# Patient Record
Sex: FEMALE
[De-identification: names, ages, dates, MRNs, and addresses within clinical notes are randomized per-mention and may not be internally consistent; named-entity substitution may affect disease eponyms.]

---

## 2018-07-13 NOTE — PRE-PROCEDURE NOTE/ATTESTATION
Pre-Procedure Note/Attestation


Complete Prior to Procedure


Planned Procedure:  left


Procedure Narrative:


1. CATARACT EXTRACTION WITH PHACO AND  PC IOL IMPLANTATION, LEFT EYE.


2. LIMBAL RELAXING INCISION, LEFT EYE





Indications for Procedure


Pre-Operative Diagnosis:


1. CATARACT , LEFT EYE.


2. ASTIGMATISM, LEFT EYE.





Attestation


I attest that I discussed the nature of the procedure; its benefits; risks and 

complications; and alternatives (and the risks and benefits of such alternatives

), prior to the procedure, with the patient (or the patient's legal 

representative).





I attest that, if there was a reasonable possibility of needing a blood 

transfusion, the patient (or the patient's legal representative) was given the 

[place] Department of Health Services standardized written summary, pursuant 

to the [place] Blood Safety Act ([place] Health and Safety Code # 1645, as 

amended).





I attest that I re-evaluated the patient just prior to the surgery and that 

there has been no change in the patient's H&P, except as documented below:











Aldo Babin MD Jul 13, 2018 15:18

## 2018-07-17 ENCOUNTER — HOSPITAL ENCOUNTER (OUTPATIENT)
Dept: HOSPITAL 72 - SUR | Age: 68
Discharge: HOME | End: 2018-07-17
Payer: MEDICARE

## 2018-07-17 VITALS — SYSTOLIC BLOOD PRESSURE: 113 MMHG | DIASTOLIC BLOOD PRESSURE: 67 MMHG

## 2018-07-17 VITALS — SYSTOLIC BLOOD PRESSURE: 114 MMHG | DIASTOLIC BLOOD PRESSURE: 63 MMHG

## 2018-07-17 VITALS — SYSTOLIC BLOOD PRESSURE: 105 MMHG | DIASTOLIC BLOOD PRESSURE: 68 MMHG

## 2018-07-17 VITALS — DIASTOLIC BLOOD PRESSURE: 66 MMHG | SYSTOLIC BLOOD PRESSURE: 115 MMHG

## 2018-07-17 VITALS — SYSTOLIC BLOOD PRESSURE: 102 MMHG | DIASTOLIC BLOOD PRESSURE: 64 MMHG

## 2018-07-17 VITALS — DIASTOLIC BLOOD PRESSURE: 65 MMHG | SYSTOLIC BLOOD PRESSURE: 107 MMHG

## 2018-07-17 VITALS — DIASTOLIC BLOOD PRESSURE: 77 MMHG | SYSTOLIC BLOOD PRESSURE: 117 MMHG

## 2018-07-17 VITALS — HEIGHT: 60 IN | BODY MASS INDEX: 23.16 KG/M2 | WEIGHT: 118 LBS

## 2018-07-17 VITALS — DIASTOLIC BLOOD PRESSURE: 70 MMHG | SYSTOLIC BLOOD PRESSURE: 116 MMHG

## 2018-07-17 VITALS — DIASTOLIC BLOOD PRESSURE: 66 MMHG | SYSTOLIC BLOOD PRESSURE: 111 MMHG

## 2018-07-17 DIAGNOSIS — Z87.891: ICD-10-CM

## 2018-07-17 DIAGNOSIS — K21.9: ICD-10-CM

## 2018-07-17 DIAGNOSIS — H25.812: Primary | ICD-10-CM

## 2018-07-17 DIAGNOSIS — H35.3130: ICD-10-CM

## 2018-07-17 DIAGNOSIS — E78.00: ICD-10-CM

## 2018-07-17 DIAGNOSIS — E78.5: ICD-10-CM

## 2018-07-17 DIAGNOSIS — H52.202: ICD-10-CM

## 2018-07-17 DIAGNOSIS — E89.0: ICD-10-CM

## 2018-07-17 LAB
ADD MANUAL DIFF: NO
ANION GAP SERPL CALC-SCNC: 7 MMOL/L (ref 5–15)
BASOPHILS NFR BLD AUTO: 0.7 % (ref 0–2)
BUN SERPL-MCNC: 8 MG/DL (ref 7–18)
CALCIUM SERPL-MCNC: 8.5 MG/DL (ref 8.5–10.1)
CHLORIDE SERPL-SCNC: 108 MMOL/L (ref 98–107)
CO2 SERPL-SCNC: 25 MMOL/L (ref 21–32)
CREAT SERPL-MCNC: 0.7 MG/DL (ref 0.55–1.3)
EOSINOPHIL NFR BLD AUTO: 10.8 % (ref 0–3)
ERYTHROCYTE [DISTWIDTH] IN BLOOD BY AUTOMATED COUNT: 12 % (ref 11.6–14.8)
HCT VFR BLD CALC: 37.8 % (ref 37–47)
HGB BLD-MCNC: 12.4 G/DL (ref 12–16)
LYMPHOCYTES NFR BLD AUTO: 29.2 % (ref 20–45)
MCV RBC AUTO: 88 FL (ref 80–99)
MONOCYTES NFR BLD AUTO: 7.7 % (ref 1–10)
NEUTROPHILS NFR BLD AUTO: 51.7 % (ref 45–75)
PLATELET # BLD: 193 K/UL (ref 150–450)
POTASSIUM SERPL-SCNC: 4 MMOL/L (ref 3.5–5.1)
RBC # BLD AUTO: 4.3 M/UL (ref 4.2–5.4)
SODIUM SERPL-SCNC: 140 MMOL/L (ref 136–145)
WBC # BLD AUTO: 5.4 K/UL (ref 4.8–10.8)

## 2018-07-17 PROCEDURE — 94150 VITAL CAPACITY TEST: CPT

## 2018-07-17 PROCEDURE — 93005 ELECTROCARDIOGRAM TRACING: CPT

## 2018-07-17 PROCEDURE — 85025 COMPLETE CBC W/AUTO DIFF WBC: CPT

## 2018-07-17 PROCEDURE — 36415 COLL VENOUS BLD VENIPUNCTURE: CPT

## 2018-07-17 PROCEDURE — 80048 BASIC METABOLIC PNL TOTAL CA: CPT

## 2018-07-17 PROCEDURE — 66984 XCAPSL CTRC RMVL W/O ECP: CPT

## 2018-07-17 PROCEDURE — 94003 VENT MGMT INPAT SUBQ DAY: CPT

## 2018-07-17 PROCEDURE — 66999 UNLISTED PX ANT SEGMENT EYE: CPT

## 2018-07-17 RX ADMIN — LIDOCAINE HYDROCHLORIDE SCH DROP: 35 GEL OPHTHALMIC at 08:42

## 2018-07-17 RX ADMIN — PHENYLEPHRINE HYDROCHLORIDE SCH DROP: 100 SOLUTION/ DROPS OPHTHALMIC at 08:47

## 2018-07-17 RX ADMIN — Medication SCH DROP: at 08:41

## 2018-07-17 RX ADMIN — PHENYLEPHRINE HYDROCHLORIDE SCH DROP: 100 SOLUTION/ DROPS OPHTHALMIC at 08:34

## 2018-07-17 RX ADMIN — MOXIFLOXACIN HYDROCHLORIDE SCH DROP: 5 SOLUTION/ DROPS OPHTHALMIC at 08:34

## 2018-07-17 RX ADMIN — MOXIFLOXACIN HYDROCHLORIDE SCH DROP: 5 SOLUTION/ DROPS OPHTHALMIC at 08:41

## 2018-07-17 RX ADMIN — DICLOFENAC SODIUM SCH DROP: 1 SOLUTION/ DROPS OPHTHALMIC at 08:34

## 2018-07-17 RX ADMIN — PHENYLEPHRINE HYDROCHLORIDE SCH DROP: 100 SOLUTION/ DROPS OPHTHALMIC at 08:42

## 2018-07-17 RX ADMIN — DICLOFENAC SODIUM SCH DROP: 1 SOLUTION/ DROPS OPHTHALMIC at 08:47

## 2018-07-17 RX ADMIN — MOXIFLOXACIN HYDROCHLORIDE SCH DROP: 5 SOLUTION/ DROPS OPHTHALMIC at 08:46

## 2018-07-17 RX ADMIN — Medication SCH DROP: at 08:34

## 2018-07-17 RX ADMIN — LIDOCAINE HYDROCHLORIDE SCH DROP: 35 GEL OPHTHALMIC at 08:35

## 2018-07-17 RX ADMIN — DICLOFENAC SODIUM SCH DROP: 1 SOLUTION/ DROPS OPHTHALMIC at 08:41

## 2018-07-17 RX ADMIN — Medication SCH DROP: at 08:46

## 2018-07-17 RX ADMIN — LIDOCAINE HYDROCHLORIDE SCH DROP: 35 GEL OPHTHALMIC at 08:47

## 2018-07-17 NOTE — DISCHARGE SUMMARY
Discharge Summary


DATE OF ADMISSION:  07/17/2018





DATE OF DISCHARGE: 07/17/2018





REASON FOR HOSPITALIZATION: 1- Cataract, left eye 2- Astigmatism, left eye





SURGERY PERFORMED: 1- cataract extraction, left eye 2- LRI, left eye 








CONDITION IN THE HOSPITAL:The patient tolerated the surgery without 

complications.





DISCHARGE CONDITION: The patient was stable at discharge.





DISCHARGE MEDICATIONS:


1. Vigamox eye drops one drop q.i.d, OS


2. Prednisolone one drop q.i.d, OS   


3. Prolensa 1 drop qd, OS








POSTOPERATIVE ORDERS: The patient has to rest at home. No bending, No lifting, 

No watching Television tonight.





POSTOPERATIVE FOLLOW UP: The patient will be followed in my office tomorrow 

morning at 7 o'clock.











Aldo Babin MD Jul 17, 2018 10:26

## 2018-07-17 NOTE — PRE-OP HX & PHY REPO 2 SIG
DATE OF ADMISSION:  2018



PRESURGICAL INTERNAL MEDICINE HISTORY AND PHYSICAL



DATE OF EVALUATION:  2018.



REASON FOR EVALUATION:  I was asked by Dr. Aldo Babin to see this

67-year-old female, who is going for elective surgery on the left eye.

The patient has a cataract, left eye.  Please see full Ophthalmology

History and Physical by Dr. Aldo Babin.  The patient was evaluated.

Chart was reviewed.



PAST MEDICAL HISTORY AND REVIEW OF SYSTEMS:  Remarkable for hypothyroidism

and GERD.  No history of stroke.  No hypertension.  Denies history of

diabetes.  No MI or chest pain.  Denies respiratory problem, asthma or

bronchitis.  Denies renal failure.



PAST SURGICAL HISTORY:  Thyroidectomy two years ago for tumor.



FAMILY HISTORY:  Mother  with history of dialysis and renal failure.

Father has stroke.



ALLERGIES:  Not known.



PRESENT MEDICATIONS:  Include levothyroxine 0.1 mg, vitamin E, A, fish oil,

and omeprazole p.r.n.



SOCIAL HISTORY:  The patient smoked in the past, more than 15 years ago.

Denies alcohol or street drug use.



PHYSICAL EXAMINATION:

GENERAL:  Alert, well-developed, well-nourished female, in her 60s.

 

VITAL SIGNS:  Weight 111 pounds, blood pressure 117/77, temperature 98,

pulse 61, respirations 18, and O2 saturation 99% on room air.

SKIN:  Dry and warm.  No rashes.  No ulcers.

HEENT:  Head, normocephalic, atraumatic.  Ears, clear.  Eyes, full

description per Dr. Aldo Babin.  Mouth, clear and moist.

NECK:  Supple.  Anterior wall scar.  No palpable mass.

LYMPHATICS:  Lymph nodes not enlarged.

CHEST:  No deformity or asymmetry.

LUNGS:  Clear to auscultation and percussion.  No rales or rhonchi.

HEART:  Sinus rhythm.  No ectopy.  No murmur.  No S3 or S4.

ABDOMEN:  Soft, benign.  Liver and spleen not enlarged.  No

rebound.

EXTREMITIES:  No edema.  No varicose vein.  No calf tenderness.

GENITOURINARY TRACT:  Denies dysuria.  No CVA tenderness.

NERVOUS SYSTEM:  No tremor.  No nystagmus.



DIAGNOSTIC AND LABORATORY DATA:  ECG, normal sinus rhythm, normal ECG.  The

patient did not eat or drink from last night.  Lab work pending.



IMPRESSION:

1. Cataract, left eye.

2. Hypothyroidism.

3. GERD.



PLAN:  Cataract extraction, left eye with intraocular lens implant per Dr. Aldo Babin.



CONCLUSION:  The patient's vital signs stable.  The patient did not eat or

drink from last night.  The patient's ECG showed normal sinus rhythm,

normal ECG.  Laboratory work pending.  The patient's condition optimized

for surgery.



Thank you very much, Dr. Babin, for privilege to participate in

presurgical care of this interesting patient.









  ______________________________________________

  Dimitrios Garcia M.D.





DR:  RAJIV

D:  2018 09:57

T:  2018 00:04

JOB#:  [id number]

CC:

## 2018-07-17 NOTE — BRIEF OPERATIVE NOTE
Immediate Post Operative Note


Operative Note


Chief Complaint:   Blurry vision, difficulty driving and reading, left eye


Pre-op Diagnosis:


1. CATARACT , LEFT EYE.


2. ASTIGMATISM, LEFT EYE.


Procedure:


1- Cataract extraction with phaco and PC IOL implantation, left eye


2- Limbal relaxing incision LRI ), left eye


Post-op Diagnosis:  same as pre-op


Surgeon:  Aldo moore MD


Assistant:  None


Additional Surgeons:  None


Anesthesiologist:  Petty Lincoln


Anesthesia:  MAC


Specimen:  none


Complications:  none


Condition:  stable


Fluids:  500ml


Estimated Blood Loss:  none


Drains:  none


Implant(s) used?:  Yes - Monofocal PC IOl implanted in the left eye without 

complication











Aldo Moore MD Jul 17, 2018 10:29

## 2018-07-17 NOTE — ANETHESIA PREOPERATIVE EVAL
Anesthesia Pre-op PMH/ROS


General


Date of Evaluation:  Jul 17, 2018


Time of Evaluation:  09:27


Anesthesiologist:  


ASA Score:  ASA 2


Mallampati Score


Class I : Soft palate, uvula, fauces, pillars visible


Class II: Soft palate, uvula, fauces visible


Class III: Soft palate, base of uvula visible


Class IV: Only hard plate visible


Mallampati Classification:  Class II


Surgeon:  oscar


Diagnosis:  left eye cataract


Surgical Procedure:  left eye cataract extraction w/iol implant


Anesthesia History:  none


Family History:  no anesthesia problems


Allergies:  


Coded Allergies:  


     No Known Allergies (Unverified , 7/13/18)





Past Medical History


Cardiovascular:  Denies: HTN, CAD, MI, valve dz, arrhythmia, other


Pulmonary:  Denies: asthma, COPD, AISSATOU, other


Gastrointestinal/Genitourinary:  Reports: GERD; 


   Denies: CRI, ESRD, other


Neurologic/Psychiatric:  Denies: dementia, CVA, depression/anxiety, TIA, other


Endocrine:  Reports: hypothyroidism; 


   Denies: DM, steroids, other


HEENT:  Reports: cataract (L); 


   Denies: cataract (R), glaucoma, [place] (L), [place] (R), other


Hematology/Immune:  Denies: anemia, DVT, bleeding disorder, other


Musculoskeletal/Integumentary:  Denies: OA, RA, DJD, DDD, edema, other


PSxH Narrative:


thyroid





Anesthesia Pre-op Phys. Exam


Physician Exam





Last Vital Signs








  Date Time  Temp Pulse Resp B/P (MAP) Pulse Ox O2 Delivery O2 Flow Rate FiO2


 


7/17/18 08:45      Room Air  


 


7/17/18 08:41 98.0 61 18 117/77 (90) 99   





 98.0       








Constitutional:  NAD


Cardiovascular:  RRR


Respiratory:  CTA


Gastrointestinal:  S/NT/ND





Airway Exam


Mallampati Score:  Class II


MO:  full


ROM:  full


Teeth:  intact


Dentures:  no upper, no lower





Anesthesia Pre-op A/P


Labs





Hematology








Test


  7/17/18


08:15


 


White Blood Count


  5.4 K/UL


(4.8-10.8)


 


Red Blood Count


  4.30 M/UL


(4.20-5.40)


 


Hemoglobin


  12.4 G/DL


(12.0-16.0)


 


Hematocrit


  37.8 %


(37.0-47.0)


 


Mean Corpuscular Volume 88 FL (80-99)  


 


Mean Corpuscular Hemoglobin


  28.8 PG


(27.0-31.0)


 


Mean Corpuscular Hemoglobin


Concent 32.8 G/DL


(32.0-36.0)


 


Red Cell Distribution Width


  12.0 %


(11.6-14.8)


 


Platelet Count


  193 K/UL


(150-450)


 


Mean Platelet Volume


  6.9 FL


(6.5-10.1)


 


Neutrophils (%) (Auto)


  51.7 %


(45.0-75.0)


 


Lymphocytes (%) (Auto)


  29.2 %


(20.0-45.0)


 


Monocytes (%) (Auto)


  7.7 %


(1.0-10.0)


 


Eosinophils (%) (Auto)


  10.8 %


(0.0-3.0)  H


 


Basophils (%) (Auto)


  0.7 %


(0.0-2.0)








Chemistry








Test


  7/17/18


08:15


 


Sodium Level


  140 MMOL/L


(136-145)


 


Potassium Level


  4.0 MMOL/L


(3.5-5.1)


 


Chloride Level


  108 MMOL/L


()  H


 


Carbon Dioxide Level


  25 MMOL/L


(21-32)


 


Anion Gap


  7 mmol/L


(5-15)


 


Blood Urea Nitrogen


  8 mg/dL (7-18)


 


 


Creatinine


  0.7 MG/DL


(0.55-1.30)


 


Estimat Glomerular Filtration


Rate > 60 mL/min


(>60)


 


Glucose Level


  102 MG/DL


()


 


Calcium Level


  8.5 MG/DL


(8.5-10.1)











Risk Assessment & Plan


Assessment:


asa 2


Plan:


MAC


Status Change Before Surgery:  No





Pre-Antibiotics


Drug:  none











Ana Kim M.D. Jul 17, 2018 09:17

## 2018-07-17 NOTE — OPERATIVE NOTE - DICTATED
DATE OF OPERATION:  07/17/2018



FACILITY:  [facility].



SURGEON:  Aldo Babin M.D.



ASSISTANT:  None.



ANESTHESIOLOGIST:  Dr. Kim.



ANESTHESIA:  Monitored anesthesia care (MAC).



PREOPERATIVE DIAGNOSES:

1. Cataract, left eye.

2. Astigmatism, left eye.



POSTOPERATIVE DIAGNOSES:

1. Cataract, left eye.

2. Astigmatism, left eye.



SURGERY PERFORMED:

1. Cataract extraction with phacoemulsification and posterior chamber

intraocular lens implantation, left eye.

2. Limbal-relaxing incision (LRI), left eye.



INDICATION FOR SURGERY:  The patient is a 67-year-old lady with history of

age-related macular degeneration in both eyes, hypercholesterolemia, and

hyperlipidemia.  She is taking medications including levothyroxine for

hypothyroidism, simvastatin for hyperlipidemia, and Diovan for

hypertension.  She is not allergic to any medications.  She is not

drinker.  She is not a smoker.  She is complaining of blurred vision in

the left eye.  On examination of the left eye, the cornea is clear.  The

anterior chamber is clean and quiet.  Pupillary reflexes normal.  There is

no RAPD.  There is 4+ nuclear sclerosis and 2+ cortical cataract in the

left eye.  On examination of the fundus, there is _____ age-related

macular degeneration, but optic disc and peripheral retina are within

normal limits.  To improve her vision in the left eye, the cataract has to

be removed and posterior chamber intraocular lens has to be implanted.



INFORMED CONSENT:  The nature of the surgery, risks, benefits,

alternatives, and potential complications were explained all in detail to

the patient.  The potential complications including but not limited to

bleeding, infection, posterior capsular rupture, lens subluxation, flat

anterior chamber, iris prolapse, uveitis, corneal edema, macular edema,

endophthalmitis, retinal detachment, loss of vision, and even loss of the

eye were all explained in detail to the patient.  The patient voiced

understanding and accepted all the complications.  The alternatives

including accommodating lens, multifocal lens, toric lens, and

conventional cataract surgery with limbal-relaxing incision (LRI) for

treatment of astigmatism were all explained in detail to the patient who

voiced understanding.  The patient elected to have conventional cataract

surgery with limbal-relaxing incision for treatment of astigmatism.  Then,

she signed the consent form which is in the chart.



DESCRIPTION OF SURGERY AND FINDINGS:  Following that, the patient was taken

to the operating room in stable condition.  Lidocaine gel, Akten 3.5% were

applied to the conjunctiva of the left eye.  IV sedation was given by the

anesthesiologist, Dr. Kim.  After adequate anesthesia and sedation had

been achieved, the left eye was prepped and draped in sterile fashion for

intraocular surgery.  Following that, a speculum was placed in the left

eye.  Before the patient was taken to the operation room, the cornea was

marked at 180 and 90 meridian.  In the operation room, using a corneal

marking pen, the steep meridian of the cornea was marked.  Following that,

using a natasha knife with 600 micron blade, two parallel incisions were

placed on the steep meridian of the cornea to treat the astigmatism.

Following that, using a Super Sharp knife, a clear corneal side port was

created.  A 1% lidocaine without preservatives (MPF) was injected into the

anterior chamber.  Viscoelastic agent was injected into the anterior

chamber.  Following that, clear corneal temporal keratotomy was performed

with a 2.8 mm keratome.  Following that, viscoelastic agent was injected

into the anterior chamber again.  Following that, Vision Blue was injected

under the viscoelastic agent to stain the anterior capsule.  Following

that, clear fresh viscoelastic agent, Healon, was injected into the

anterior chamber again.  Under the viscoelastic agent, an anterior

capsulotomy was performed in the fashion of capsulorrhexis beautifully.

Following that, the viscoelastic agent was removed from the anterior

chamber.  Following that, using balanced salt solution, hydrodissection

and hydrodelineation was performed and the nucleus was freed.  Following

that, viscoelastic agent was injected into the anterior chamber again to

protect the endothelium of the cornea.  Following that, using

phacoemulsification machine in the fashion of horizontal chop, the nucleus

was removed in toto.  Following that, the cortical material was removed

from the capsular bag using irrigation-aspiration unit.  Following that,

the capsular bag was polished.  Following that, the capsular bag was

filled with viscoelastic agent, Healon.  Following that, +24.5 diopter

_____ foldable IOL with serial number [id number] was injected into the

capsular bag.  Using a Sinskey hook, the lens was manipulated and put in

proper position.  Following that, viscoelastic agent was removed from the

anterior and posterior part of the lens and the anterior chamber was

filled with balanced salt solution.  Following that, the wound was

hydrated with balanced salt solution.  Following that, the wound was

checked for leakage and there was no leakage.  Vigamox eyedrops were

applied to the conjunctiva of the left eye.  The patient tolerated the

surgery without complications.  At the end of the surgery, the eye was

patched with clear sterile fenestrated shield.



Following that, the patient was transferred to the recovery room.  In

the recovery room, 125 mg Diamox was given by mouth stat.  Postoperative

orders and directions were given to the patient.  The patient will be

discharged home upon stabilization.  The patient will be followed in my

office tomorrow morning.









  ______________________________________________

  Aldo Babin M.D.





DR:  Kaleigh

D:  07/17/2018 14:44

T:  07/17/2018 20:08

JOB#:  [id number]

CC:

## 2018-07-19 NOTE — CARDIOLOGY REPORT
--------------- APPROVED REPORT --------------





EKG Measurement

Heart [id number]

WA [id number]

[id number]

[id number]

[id number]





Normal sinus rhythm

Electrical artifact.

Normal ECG

## 2018-07-27 NOTE — PRE-PROCEDURE NOTE/ATTESTATION
Pre-Procedure Note/Attestation


Complete Prior to Procedure


Planned Procedure:  right


Procedure Narrative:


1. CATARACT EXTRACTION WITH PHACO AND  PC IOL IMPLANTATION, RIGHT EYE.


2. LIMBAL RELAXING INCISION, RIGHT EYE





Indications for Procedure


Pre-Operative Diagnosis:


1. CATARACT , RIGHT EYE.


2. ASTIGMATISM, RIGHT EYE.





Attestation


I attest that I discussed the nature of the procedure; its benefits; risks and 

complications; and alternatives (and the risks and benefits of such alternatives

), prior to the procedure, with the patient (or the patient's legal 

representative).





I attest that, if there was a reasonable possibility of needing a blood 

transfusion, the patient (or the patient's legal representative) was given the 

[place] Department of Health Services standardized written summary, pursuant 

to the [place] Blood Safety Act ([place] Health and Safety Code # 1645, as 

amended).





I attest that I re-evaluated the patient just prior to the surgery and that 

there has been no change in the patient's H&P, except as documented below:











Aldo Babin MD Jul 27, 2018 15:20

## 2018-07-31 ENCOUNTER — HOSPITAL ENCOUNTER (OUTPATIENT)
Dept: HOSPITAL 72 - SUR | Age: 68
Discharge: HOME | End: 2018-07-31
Payer: MEDICARE

## 2018-07-31 VITALS — DIASTOLIC BLOOD PRESSURE: 82 MMHG | SYSTOLIC BLOOD PRESSURE: 127 MMHG

## 2018-07-31 VITALS — DIASTOLIC BLOOD PRESSURE: 74 MMHG | SYSTOLIC BLOOD PRESSURE: 118 MMHG

## 2018-07-31 VITALS — HEIGHT: 60 IN | BODY MASS INDEX: 23.16 KG/M2 | WEIGHT: 118 LBS

## 2018-07-31 VITALS — SYSTOLIC BLOOD PRESSURE: 110 MMHG | DIASTOLIC BLOOD PRESSURE: 61 MMHG

## 2018-07-31 VITALS — SYSTOLIC BLOOD PRESSURE: 114 MMHG | DIASTOLIC BLOOD PRESSURE: 76 MMHG

## 2018-07-31 VITALS — DIASTOLIC BLOOD PRESSURE: 73 MMHG | SYSTOLIC BLOOD PRESSURE: 112 MMHG

## 2018-07-31 VITALS — DIASTOLIC BLOOD PRESSURE: 72 MMHG | SYSTOLIC BLOOD PRESSURE: 123 MMHG

## 2018-07-31 VITALS — SYSTOLIC BLOOD PRESSURE: 117 MMHG | DIASTOLIC BLOOD PRESSURE: 63 MMHG

## 2018-07-31 VITALS — SYSTOLIC BLOOD PRESSURE: 121 MMHG | DIASTOLIC BLOOD PRESSURE: 70 MMHG

## 2018-07-31 VITALS — SYSTOLIC BLOOD PRESSURE: 118 MMHG | DIASTOLIC BLOOD PRESSURE: 75 MMHG

## 2018-07-31 DIAGNOSIS — R00.1: ICD-10-CM

## 2018-07-31 DIAGNOSIS — K21.9: ICD-10-CM

## 2018-07-31 DIAGNOSIS — M19.90: ICD-10-CM

## 2018-07-31 DIAGNOSIS — H25.811: Primary | ICD-10-CM

## 2018-07-31 DIAGNOSIS — E03.9: ICD-10-CM

## 2018-07-31 DIAGNOSIS — H52.201: ICD-10-CM

## 2018-07-31 PROCEDURE — 94150 VITAL CAPACITY TEST: CPT

## 2018-07-31 PROCEDURE — 66984 XCAPSL CTRC RMVL W/O ECP: CPT

## 2018-07-31 PROCEDURE — 66999 UNLISTED PX ANT SEGMENT EYE: CPT

## 2018-07-31 PROCEDURE — 93005 ELECTROCARDIOGRAM TRACING: CPT

## 2018-07-31 PROCEDURE — 94003 VENT MGMT INPAT SUBQ DAY: CPT

## 2018-07-31 RX ADMIN — PHENYLEPHRINE HYDROCHLORIDE SCH DROP: 100 SOLUTION/ DROPS OPHTHALMIC at 07:27

## 2018-07-31 RX ADMIN — LIDOCAINE HYDROCHLORIDE SCH DROP: 35 GEL OPHTHALMIC at 07:11

## 2018-07-31 RX ADMIN — DICLOFENAC SODIUM SCH DROP: 1 SOLUTION/ DROPS OPHTHALMIC at 07:27

## 2018-07-31 RX ADMIN — PHENYLEPHRINE HYDROCHLORIDE SCH DROP: 100 SOLUTION/ DROPS OPHTHALMIC at 07:20

## 2018-07-31 RX ADMIN — MOXIFLOXACIN HYDROCHLORIDE SCH DROP: 5 SOLUTION/ DROPS OPHTHALMIC at 07:27

## 2018-07-31 RX ADMIN — LIDOCAINE HYDROCHLORIDE SCH DROP: 35 GEL OPHTHALMIC at 07:27

## 2018-07-31 RX ADMIN — MOXIFLOXACIN HYDROCHLORIDE SCH DROP: 5 SOLUTION/ DROPS OPHTHALMIC at 07:20

## 2018-07-31 RX ADMIN — DICLOFENAC SODIUM SCH DROP: 1 SOLUTION/ DROPS OPHTHALMIC at 07:20

## 2018-07-31 RX ADMIN — PHENYLEPHRINE HYDROCHLORIDE SCH DROP: 100 SOLUTION/ DROPS OPHTHALMIC at 07:11

## 2018-07-31 RX ADMIN — DICLOFENAC SODIUM SCH DROP: 1 SOLUTION/ DROPS OPHTHALMIC at 07:11

## 2018-07-31 RX ADMIN — LIDOCAINE HYDROCHLORIDE SCH DROP: 35 GEL OPHTHALMIC at 07:20

## 2018-07-31 RX ADMIN — MOXIFLOXACIN HYDROCHLORIDE SCH DROP: 5 SOLUTION/ DROPS OPHTHALMIC at 07:11

## 2018-07-31 RX ADMIN — Medication SCH DROP: at 07:11

## 2018-07-31 RX ADMIN — Medication SCH DROP: at 07:27

## 2018-07-31 RX ADMIN — Medication SCH DROP: at 07:20

## 2018-07-31 NOTE — CARDIOLOGY REPORT
--------------- APPROVED REPORT --------------





EKG Measurement

Heart [id number]

MD [id number]

[id number]

[id number]

[id number]





Sinus bradycardia

Otherwise normal ECG

## 2018-07-31 NOTE — OPERATIVE NOTE - DICTATED
DATE OF OPERATION:  07/31/2018



FACILITY:  [facility].



SURGEON:  Aldo Babin M.D.



ASSISTANT:  None.



ANESTHESIOLOGIST:  Dr. Lynn.



ANESTHESIA:  Monitored anesthesia care (MAC).



PREOPERATIVE DIAGNOSES:

1. Cataract, right eye.

2. Astigmatism, right eye.



POSTOPERATIVE DIAGNOSES:

1. Cataract, right eye.

2. Astigmatism, right eye.



SURGERY PERFORMED:

1. Cataract extraction with phacoemulsification and posterior chamber

intraocular lens implantation in the right eye.

2. Limbal relaxing incision (LRI) in the right eye.



INDICATION FOR SURGERY:  The patient is a 67-year-old lady with history of

hypothyroidism due to thyroid surgery.  She is taking Synthroid for

hypothyroidism.  Otherwise, she is healthy.  She is not allergic to any

medication.  She is not a drinker.  She is not a smoker.  She has had

cataract surgery in the left eye three weeks ago and she is happy with the

results very much.  Now, she is complaining of blurred vision in the right

eye.  On examination of the right eye, the cornea is clear.  Anterior

chamber is clear and quiet.  Pupillary reflexes normal.  There is no RAPD.

There is 4+ nuclear sclerosis and 2 + cortical cataract. Funduscopy shows

macular degeneration, but optic disc and periphery retina are within

normal limits.  To improve her vision in the right eye, the cataract has

to be removed and intraocular lens has to be implanted.  But astigmatism

has to be addressed as well.



INFORMED CONSENT:  The nature of the surgery, risks, benefits,

alternatives, and potential complications were all explained in detail to

the patient.  The potential complications including but not limited to

bleeding, infection, posterior capsular rupture, lens subluxation, flat

anterior chamber, iris prolapse, uveitis, corneal edema, macular edema,

endophthalmitis, retinal detachment, loss of vision, and even loss of the

eye were all explained in detail to the patient.  The patient voiced

understanding and accepted all the complications.  The alternatives

including accommodating lens, multifocal lens, toric lens, and

conventional cataract surgery with limbal-relaxing incision (LRI) for

treatment of astigmatism were all explained to the patient.  She voiced

understanding and accepted all the complications.  She elected to have

conventional cataract surgery with limbal relaxing incision for treatment

of astigmatism in the right eye.  Then, she signed a consent form, which

is in the chart.



DESCRIPTION OF SURGERY AND FINDINGS:  Following that, the patient was taken

to the operation room in a stable condition.  Lidocaine gel Akten 3.5%

were applied to the conjunctiva of the right eye.  IV sedation was given

by the anesthesiologist, Dr. Lynn.  After adequate anesthesia and

sedation had been achieved, the right eye was prepped and draped in a

sterile fashion for intraocular surgery.  Following that, a speculum was

placed in the right eye.  Before the patient was taken to the operating

room, the cornea was marked at 180 and 90 meridian.  In the operating

room, using a corneal marker and marking pen, the meridian of the cornea

was marked.  Following that, using a natasha knife with 600 blade, two

parallel incisions was placed in the steep meridian of the cornea to treat

the astigmatism.  Following that, using a Super Sharp knife, a clear

corneal side port was created.  Following that, 1% Lidocaine without

preservative (MPF) was injected into the anterior chamber.  Viscoelastic

agent Healon was injected into the anterior chamber.  Following that,

using a 2.8 mm keratome, a clear corneal temporal keratotomy was

performed.  Following that, VisionBlue was injected under the viscoelastic

agent to stain the anterior capsule of the crystalline lens.  Following

that, clear fresh viscoelastic agent was injected into the anterior

chamber again.  Under the viscoelastic agent, an anterior capsulotomy was

performed in the fashion of capsulorrhexis beautifully.  Following that,

whole viscoelastic agent was removed from the anterior chamber.  Following

that, using a balanced salt solution, hydrodissection and hydrodelineation

was performed and the nucleus was freed.  Following that, a clear fresh

viscoelastic agent was injected into the anterior chamber to protect the

endothelium of the cornea.  Following that, using the phacoemulsification

machine in the fashion of horizontal chop, the nucleus was removed in

toto.  Following that, using the irrigation aspiration unit, cortical

material was removed from the capsular bag and the capsular bag was

polished.  Following that, the capsular bag was filled with clear fresh

viscoelastic agent.  Following that, a +25.0 diopter PCB00 foldable IOL

with serial number [id number] was inserted into the capsular bag.  Using a

Sinskey hook, the lens was manipulated within the proper position.

Following that, the viscoelastic agent was removed from the anterior

posterior part of the lens.  Following that, anterior chamber was filled

with balanced salt solution and the wound was hydrated with balanced salt

solution.  The wound was checked for leakage and there was no leakage.

Vigamox eye drops were applied to the conjunctiva of the right eye.  The

patient tolerated the surgery without complications.  At the end of the

surgery, the eye was patched with a clear sterile fenestrated shield.



Following that, the patient was transferred to the recovery room.  In

the recovery room, a 125 mg Diamox was given by mouth stat.  Postoperative

orders and directions were given to the patient.  The patient will be

discharged home upon stabilization.  The patient will be followed in my

office tomorrow morning.









  ______________________________________________

  Aldo Babin M.D.





DR:  JORJE

D:  07/31/2018 14:03

T:  07/31/2018 16:13

JOB#:  [id number]

CC:

## 2018-07-31 NOTE — BRIEF OPERATIVE NOTE
Immediate Post Operative Note


Operative Note


Chief Complaint:  Blurry vision, difficulty driving and reading, right eye


Pre-op Diagnosis:


1. CATARACT , RIGHT EYE.


2. ASTIGMATISM, RIGHT EYE.


Procedure:


1- Cataract extraction with phaco and PC IOL implantation, right eye


2- Limbal relaxin incision ( LRI ), right eye


Post-op Diagnosis:  same as pre-op


Surgeon:  Aldo moore MD


Assistant:  None


Additional Surgeons:  None


Anesthesiologist:  Dr. Lynn


Anesthesia:  MAC


Specimen:  none


Complications:  none


Condition:  stable


Fluids:  500ml


Estimated Blood Loss:  none


Drains:  none


Implant(s) used?:  Yes - Monofocal PC IOL implanted in the right eye without 

complication











Aldo Moore MD Jul 31, 2018 09:14

## 2018-07-31 NOTE — ANETHESIA PREOPERATIVE EVAL
Anesthesia Pre-op PMH/ROS


General


Date of Evaluation:  Jul 31, 2018


Anesthesiologist:  [place]


ASA Score:  ASA 2


Mallampati Score


Class I : Soft palate, uvula, fauces, pillars visible


Class II: Soft palate, uvula, fauces visible


Class III: Soft palate, base of uvula visible


Class IV: Only hard plate visible


Mallampati Classification:  Class II


Surgeon:  Tami


Diagnosis:  Right cataract


Surgical Procedure:  Right cataract extraction with IOL


Anesthesia History:  none


Family History:  no anesthesia problems


Allergies:  


Coded Allergies:  


     No Known Allergies (Unverified , 7/31/18)


Medications:  see eMAR





Past Medical History


Cardiovascular:  Denies: HTN, CAD, MI, valve dz, arrhythmia, other


Pulmonary:  Denies: asthma, COPD, AISSATOU, other


Gastrointestinal/Genitourinary:  Reports: GERD; 


   Denies: CRI, ESRD, other


Neurologic/Psychiatric:  Denies: dementia, CVA, depression/anxiety, TIA, other


Endocrine:  Reports: hypothyroidism; 


   Denies: DM, steroids, other


HEENT:  Denies: cataract (L), cataract (R), glaucoma, [place] (L), [place] (R), other


Hematology/Immune:  Denies: anemia, DVT, bleeding disorder, other


Musculoskeletal/Integumentary:  Reports: OA; 


   Denies: RA, DJD, DDD, edema, other


PSxH Narrative:


thyroidectomy, left cataract





Anesthesia Pre-op Phys. Exam


Physician Exam





Last Vital Signs








  Date Time  Temp Pulse Resp B/P (MAP) Pulse Ox O2 Delivery O2 Flow Rate FiO2


 


7/31/18 07:14      Room Air  


 


7/31/18 07:10 97.7 58 18 117/63 (81) 98   





 97.7       








Constitutional:  NAD


Cardiovascular:  RRR


Respiratory:  CTA





Airway Exam


Mallampati Score:  Class II


MO:  full


ROM:  full





Anesthesia Pre-op A/P


Labs


see chart





Studies


Pre-op Studies:  EKG - sr





Risk Assessment & Plan


Assessment:


ASA II


Plan:


MAC


Status Change Before Surgery:  No





Pre-Antibiotics


Drug:  N/A











Otilia Gillis MD Jul 31, 2018 07:58

## 2018-07-31 NOTE — IMMEDIATE POST-OP EVALUATION
Immediate Post-Op Evalulation


Immediate Post-Op Evalulation


Procedure:  Right cataract extraction with IOL


Date of Evaluation:  Jul 31, 2018


Time of Evaluation:  09:16


IV Fluids:  300


Blood Products:  0


Estimated Blood Loss:  0


Urinary Output:  0


Blood Pressure Systolic:  121


Blood Pressure Diastolic:  70


Pulse Rate:  67


Respiratory Rate:  13


O2 Sat by Pulse Oximetry:  98


Temperature (Fahrenheit):  97.5


Pain Score (1-10):  0


Nausea:  No


Vomiting:  No


Complications


0


Patient Status:  awake, reacts, patent, none


Hydration Status:  adequate


Drug:  N/A











Otilia Gillis MD Jul 31, 2018 09:16

## 2018-07-31 NOTE — DISCHARGE SUMMARY
Discharge Summary


DATE OF ADMISSION: 07/31/2018





DATE OF DISCHARGE: 07/31/2017





REASON FOR HOSPITALIZATION: Cataract, right eye





SURGERY PERFORMED: 1-n cataract extraction with phaco and PC IOL implantation, 

right eye 2- LRI








CONDITION IN THE HOSPITAL:The patient tolerated the surgery without 

complications.





DISCHARGE CONDITION: The patient was stable at discharge.





DISCHARGE MEDICATIONS:


1. Vigamox eye drops one drop q.i.d, OD


2. Prednisolone one drop q.i.d, OD   


3. Prolenza 1drop QD, OD








POSTOPERATIVE ORDERS: The patient has to rest at home. No bending, No lifting, 

No watching Television tonight.





POSTOPERATIVE FOLLOW UP: The patient will be followed in my office tomorrow 

morning at 7 o'clock.











Aldo Babin MD Jul 31, 2018 09:11

## 2018-07-31 NOTE — 48 HOUR POST ANESTHESIA EVAL
Post Anesthesia Evaluation


Procedure:  Right cataract extraction with IOL


Date of Evaluation:  Jul 31, 2018


Airway:  patent


Nausea:  No


Vomiting:  No


Pain Intensity:  0


Hydration Status:  adequate


Cardiopulmonary Status:


at baseline


Mental Status/LOC:  patient returned to baseline


Post-Anesthesia Complications:


0


Follow-up care needed:  ready to discharge











Otilia Gillis MD Jul 31, 2018 09:17

## 2022-02-25 NOTE — IMMEDIATE POST-OP EVALUATION
Cool compress, deep tissue massage, gentle stretching, return for increased pain, numbness, weakness or any other concerns   Immediate Post-Op Evalulation


Immediate Post-Op Evalulation


Procedure:  left eye cataract extraction w/iol implant


Date of Evaluation:  Jul 17, 2018


Time of Evaluation:  10:21


IV Fluids:  LR 100ml


Blood Products:  0


Estimated Blood Loss:  0


Urinary Output:  0


Blood Pressure Systolic:  113


Blood Pressure Diastolic:  67


Pulse Rate:  63


Respiratory Rate:  12


O2 Sat by Pulse Oximetry:  99


Temperature (Fahrenheit):  98.0


Pain Score (1-10):  0


Nausea:  No


Vomiting:  No


Complications


none


Patient Status:  awake, patent, none


Hydration Status:  adequate


Drug:  none











Ana Kim M.D. Jul 17, 2018 10:06